# Patient Record
Sex: FEMALE | Race: WHITE
[De-identification: names, ages, dates, MRNs, and addresses within clinical notes are randomized per-mention and may not be internally consistent; named-entity substitution may affect disease eponyms.]

---

## 2019-07-25 ENCOUNTER — HOSPITAL ENCOUNTER (OUTPATIENT)
Dept: HOSPITAL 56 - MW.SDS | Age: 39
Discharge: HOME | End: 2019-07-25
Attending: OBSTETRICS & GYNECOLOGY
Payer: COMMERCIAL

## 2019-07-25 DIAGNOSIS — Z87.891: ICD-10-CM

## 2019-07-25 DIAGNOSIS — D06.9: ICD-10-CM

## 2019-07-25 DIAGNOSIS — Z88.8: ICD-10-CM

## 2019-07-25 DIAGNOSIS — N90.3: Primary | ICD-10-CM

## 2019-07-25 PROCEDURE — 56820 COLPOSCOPY VULVA: CPT

## 2019-07-25 PROCEDURE — 81025 URINE PREGNANCY TEST: CPT

## 2019-07-25 PROCEDURE — 11420 EXC H-F-NK-SP B9+MARG 0.5/<: CPT

## 2019-07-25 NOTE — [id number]
Post Anesthesia Note





- EVALUATION WITHIN 48HRS OF ANESTHETIC


Vital Signs in Normal Range: Yes


Patient Participated in Evaluation: Yes


Respiratory Function Stable: Yes


Airway Patent: Yes


Cardiovascular Function Stable: Yes


Hydration Status Stable: Yes


Pain Control Satisfactory: Yes


Nausea and Vomiting Control Satisfactory: Yes


Mental Status Recovered: Yes


Pulse Rate: 96


Resp Rate: 16


Blood Pressure: 110/52

## 2019-07-25 NOTE — PCM.POSTAN
POST ANESTHESIA ASSESSMENT





- MENTAL STATUS


Mental Status: Alert, Oriented





- VITAL SIGNS


Pulse Rate: 96


SaO2: 95


Resp Rate: 12


Blood Pressure: 110/52





- RESPIRATORY


Respiratory Status: Respiratory Rate WNL, Airway Patent, O2 Saturation Stable





- CARDIOVASCULAR


CV Status: Pulse Rate WNL, Blood Pressure Stable





- GASTROINTESTINAL


GI Status: No Symptoms





- PAIN


Pain Score: 0





- POST OP HYDRATION


Hydration Status: Adequate & Stable

## 2019-07-25 NOTE — PCM.DCSUM1
**Discharge Summary





- Hospital Course


Diagnosis: Stroke: No





- Discharge Data


Discharge Date: 07/25/19


Discharge Disposition: Home, Self-Care 01


Condition: Good





- Patient Summary/Data


Operative Procedure(s) Performed: wide local exesion of the vulva





- Patient Instructions


Diet: Usual Diet as Tolerated


Activity: As Tolerated, No Strenuous Activities, Rest and Relax Today


Driving: Do Not Drive


Showering/Bathing: May Shower


Notify Provider of: Fever, Increased Pain, Swelling and Redness, Drainage, 

Nausea and/or Vomiting


Other/Special Instructions: You have a return appointment with Dr. Howard on 

Tuesday, July 31.  at 1:30pm Central Time.





- Discharge Plan


Home Medications: 


 Home Meds





. [No Known Home Meds]  07/22/19 [History]











- Discharge Summary/Plan Comment


DC Time >30 min.: Yes





- General Info


Date of Service: 07/25/19


Functional Status: Reports: Pain Controlled





- Review of Systems


General: Reports: No Symptoms


HEENT: Reports: No Symptoms


Pulmonary: Reports: No Symptoms


Cardiovascular: Reports: No Symptoms


Gastrointestinal: Reports: No Symptoms


Genitourinary: Reports: No Symptoms


Musculoskeletal: Reports: No Symptoms


Skin: Reports: No Symptoms


Neurological: Reports: No Symptoms


Psychiatric: Reports: No Symptoms





- Patient Data


Vitals - Most Recent: 


 Last Vital Signs











Temp  36.2 C   07/25/19 06:45


 


Pulse  88   07/25/19 06:45


 


Resp  16   07/25/19 07:05


 


BP  115/79   07/25/19 06:45


 


Pulse Ox  98   07/25/19 07:05











Weight - Most Recent: 71.668 kg


Lab Results - Last 24 hrs: 


 Laboratory Results - last 24 hr











  07/25/19 Range/Units





  06:38 


 


Urine HCG, Qual  NEGATIVE  (NEGATIVE)  











Med Orders - Current: 


 Current Medications





Lactated Ringer's (Ringers, Lactated)  1,000 mls @ 100 mls/hr IV ASDIRECTED [facility]


   Last Admin: 07/25/19 06:58 Dose:  100 mls/hr





Discontinued Medications





Bupivacaine HCl (Sensorcaine-Mpf 0.5%) Confirm Administered Dose 10 ml .ROUTE 

.STK-MED ONE


   Stop: 07/25/19 07:25


Dexamethasone (Dexamethasone) Confirm Administered Dose 20 mg .ROUTE .STK-MED 

ONE


   Stop: 07/25/19 07:18


Fentanyl (Sublimaze) Confirm Administered Dose 100 mcg .ROUTE .STK-MED ONE


   Stop: 07/25/19 07:19


Fentanyl (Sublimaze) Confirm Administered Dose 100 mcg .ROUTE .STK-MED ONE


   Stop: 07/25/19 08:37


Glycopyrrolate (Robinul) Confirm Administered Dose 0.2 mg .ROUTE .STK-MED ONE


   Stop: 07/25/19 07:18


Ketorolac Tromethamine (Toradol) Confirm Administered Dose 30 mg .ROUTE .STK-

MED ONE


   Stop: 07/25/19 07:18


Lidocaine (Xylocaine-Mpf 2%) Confirm Administered Dose 5 ml .ROUTE .STK-MED ONE


   Stop: 07/25/19 07:18


Lidocaine HCl (Xylocaine 1%) Confirm Administered Dose 20 ml .ROUTE .STK-MED ONE


   Stop: 07/25/19 07:25


Midazolam HCl (Versed 1 Mg/Ml) Confirm Administered Dose 2 mg .ROUTE .STK-MED 

ONE


   Stop: 07/25/19 07:19


Ondansetron HCl (Zofran) Confirm Administered Dose 4 mg .ROUTE .STK-MED ONE


   Stop: 07/25/19 07:18


Phenylephrine HCl (Phenylephrine In Ns 100 Mcg/Ml) Confirm Administered Dose 1 

mg .ROUTE .STK-MED ONE


   Stop: 07/25/19 08:19


Propofol (Diprivan  20 Ml) Confirm Administered Dose 200 mg .ROUTE .STK-MED ONE


   Stop: 07/25/19 07:19











- Exam


General: Reports: Alert, Oriented


HEENT: Reports: Pupils Equal, Pupils Reactive, EOMI, Mucous Membr. Moist/Pink


Neck: Reports: Supple


Lungs: Reports: Clear to Auscultation, Normal Respiratory Effort


Cardiovascular: Reports: Regular Rate, Regular Rhythm


GI/Abdominal Exam: Normal Bowel Sounds, Soft, Non-Tender, No Organomegaly, No 

Distention, No Abnormal Bruit, No Mass, Pelvis Stable


 (Female) Exam: Normal External Exam, Normal Speculum Exam, Normal Bimanual 

Exam


Rectal (Female) Exam: Normal Exam, Normal Rectal Tone


Back Exam: Reports: Normal Inspection, Full Range of Motion


Extremities: Normal Inspection, Normal Range of Motion, Non-Tender, No Pedal 

Edema, Normal Capillary Refill


Skin: Reports: Warm, Dry, Intact


Wound/Incisions: Reports: Healing Well


Neurological: Reports: No New Focal Deficit


Psy/Mental Status: Reports: Alert, Normal Affect, Normal Mood

## 2019-07-25 NOTE — OR
SURGEON:

Dylon Howard MD

 

DATE OF PROCEDURE:

 

PREOPERATIVE DIAGNOSIS:

Dysplasia of the vulva.

 

POSTOPERATIVE DIAGNOSIS:

Dysplasia of the vulva.

 

OPERATION PERFORMED:

Colposcopy with a marking of the vulvar lesion and then wide local excision of

the left labia minora encompassing the lesion and other wide local excision in

the middle of the right labia minora encompassing another lesion.

 

INDICATION FOR SURGERY:

This is a 39-year-old patient.  She is referred by her primary care provider

after vulvar biopsy with [id number].  [place] referred to the admit note.

 

PROCEDURE IN DETAIL:

The patient was brought to the OR, properly identified, and after adequate level

of anesthesia, the patient was placed in lithotomy position.  Colposcopy is

performed and there are multiple lesions on the left labia minora, started with

the clitoral le down to the end of the vulva.  It is marked with the

colposcope.  There was 1 solitary lesion on the right labia minora and it was

marked too.  After that, the patient prepped and draped in sterile fashion as

usual and the operation was started by infiltrating the left labia minora with

copious amount of normal saline and using knife and then the tip of the labia

minora excised from the clitoral le down to the end of the vulva and labeled

and sent for pathology and then the area of the incision was closed with 5-0

Vicryl in a subcuticular fashion.  In the same way, infiltrating the lesion on

the right labia minor and excising it and closing the lesion.  Once that was

done, there was estimated blood loss __________ mL.  There was no complication.

The patient tolerated the procedure well, went to recovery room in stable

general condition.

 

 

GREG / ARABELLA

DD:  07/25/2019 09:37:51

DT:  07/25/2019 10:30:04

Job #:  [id number]

## 2019-07-25 NOTE — PCM.OPNOTE
- General Post-Op/Procedure Note


Date of Surgery/Procedure: 07/25/19


Operative Procedure(s): wide local exesion of the vulva


Post-Op Diagnosis: Same


Anesthesia Technique: General LMA


Primary Surgeon: Dylon Howard


EBL in mLs: 50


Complications: None


Condition: Good

## 2019-07-25 NOTE — PCM.PREANE
Preanesthetic Assessment





- Anesthesia/Transfusion/Family Hx


Anesthesia History: Prior Anesthesia Without Reaction


Family History of Anesthesia Reaction: No


Transfusion History: No Prior Transfusion(s)





- Review of Systems


General: No Symptoms


Pulmonary: No Symptoms


Cardiovascular: No Symptoms


Gastrointestinal: No Symptoms


Neurological: No Symptoms


Other: Reports: None





- Physical Assessment


NPO Status Date: 19


NPO Status Time: 23:00


O2 Sat by Pulse Oximetry: 98


Respiratory Rate: 16


Vital Signs: 





 Last Vital Signs











Temp  97.2 F   19 06:45


 


Pulse  88   19 06:45


 


Resp  16   19 06:45


 


BP  115/79   19 06:45


 


Pulse Ox  98   19 06:45











Height: 5 ft 9 in


Weight: 71.668 kg


ASA Class: 1


Mental Status: Alert & Oriented x3


Airway Class: Mallampati = 2


Dentition: Reports: Normal Dentition


ROM/Head Extension: Full


Lungs: Clear to Auscultation, Normal Respiratory Effort


Cardiovascular: Regular Rate, Regular Rhythm





- Lab


Values: 





 Laboratory Last Values











Urine HCG, Qual  NEGATIVE  (NEGATIVE)   19  06:38    














- Allergies


Allergies/Adverse Reactions: 


 Allergies











Allergy/AdvReac Type Severity Reaction Status Date / Time


 


diphenhydramine Allergy  Confusion Verified 19 13:38





[From Benadryl]     














- Blood


Blood Available: No





- Anesthesia Plan


Pre-Op Medication Ordered: None





- Acknowledgements


Anesthesia Type Planned: General Anesthesia


Pt an Appropriate Candidate for the Planned Anesthesia: Yes


Alternatives and Risks of Anesthesia Discussed w Pt/Guardian: Yes


Pt/Guardian Understands and Agrees with Anesthesia Plan: Yes


Additional Comments: 





anes prob list: none


PLAN: ga/lma





PreAnesthesia Questionnaire


HEENT History: Reports: None


Cardiovascular History: Reports: None


Respiratory History: Reports: None


Gastrointestinal History: Reports: None


Genitourinary History: Reports: None


OB/GYN History: Reports: Pregnancy, Spontaneous 


Other OB/BYN History: HPV


Musculoskeletal History: Reports: None


Neurological History: Reports: None


Psychiatric History: Reports: Anxiety


Endocrine/Metabolic History: Reports: None


Hematologic History: Reports: None


Immunologic History: Reports: None


Oncologic (Cancer) History: Reports: None


Dermatologic History: Reports: None





- Past Surgical History


Head Surgeries/Procedures: Reports: None


HEENT Surgical History: Reports: None


Cardiovascular Surgical History: Reports: None


Respiratory Surgical History: Reports: None


GI Surgical History: Reports: None


Female  Surgical History: Reports: Other (See Below)


Other Female  Surgeries/Procedures: previous excision of vulvar lesions


Endocrine Surgical History: Reports: None


Neurological Surgical History: Reports: None


Musculoskeletal Surgical History: Reports: None


Oncologic Surgical History: Reports: None


Dermatological Surgical History: Reports: None





- SUBSTANCE USE


Smoking Status *Q: Former Smoker


Tobacco Use Within Last Twelve Months: No





- HOME MEDS


Home Medications: 


 Home Meds





. [No Known Home Meds]  19 [History]











- CURRENT (IN HOUSE) MEDS


Current Meds: 





 Current Medications





Lactated Ringer's (Ringers, Lactated)  1,000 mls @ 100 mls/hr IV ASDIRECTED [facility]


   Last Admin: 19 06:58 Dose:  100 mls/hr